# Patient Record
Sex: FEMALE | URBAN - METROPOLITAN AREA
[De-identification: names, ages, dates, MRNs, and addresses within clinical notes are randomized per-mention and may not be internally consistent; named-entity substitution may affect disease eponyms.]

---

## 2017-03-30 ENCOUNTER — APPOINTMENT (RX ONLY)
Dept: URBAN - METROPOLITAN AREA CLINIC 17 | Facility: CLINIC | Age: 72
Setting detail: DERMATOLOGY
End: 2017-03-30

## 2017-03-30 DIAGNOSIS — B00.1 HERPESVIRAL VESICULAR DERMATITIS: ICD-10-CM

## 2017-03-30 DIAGNOSIS — L57.0 ACTINIC KERATOSIS: ICD-10-CM

## 2017-03-30 DIAGNOSIS — L82.1 OTHER SEBORRHEIC KERATOSIS: ICD-10-CM

## 2017-03-30 DIAGNOSIS — L57.8 OTHER SKIN CHANGES DUE TO CHRONIC EXPOSURE TO NONIONIZING RADIATION: ICD-10-CM

## 2017-03-30 DIAGNOSIS — D18.0 HEMANGIOMA: ICD-10-CM

## 2017-03-30 DIAGNOSIS — Z71.89 OTHER SPECIFIED COUNSELING: ICD-10-CM

## 2017-03-30 DIAGNOSIS — L73.8 OTHER SPECIFIED FOLLICULAR DISORDERS: ICD-10-CM

## 2017-03-30 PROBLEM — D18.01 HEMANGIOMA OF SKIN AND SUBCUTANEOUS TISSUE: Status: ACTIVE | Noted: 2017-03-30

## 2017-03-30 PROBLEM — L23.7 ALLERGIC CONTACT DERMATITIS DUE TO PLANTS, EXCEPT FOOD: Status: ACTIVE | Noted: 2017-03-30

## 2017-03-30 PROCEDURE — ? BENIGN DESTRUCTION COSMETIC

## 2017-03-30 PROCEDURE — 17000 DESTRUCT PREMALG LESION: CPT

## 2017-03-30 PROCEDURE — ? LIQUID NITROGEN

## 2017-03-30 PROCEDURE — ? COUNSELING

## 2017-03-30 PROCEDURE — 99203 OFFICE O/P NEW LOW 30 MIN: CPT | Mod: 25

## 2017-03-30 PROCEDURE — ? TREATMENT REGIMEN

## 2017-03-30 ASSESSMENT — LOCATION ZONE DERM
LOCATION ZONE: LIP
LOCATION ZONE: HAND
LOCATION ZONE: FACE

## 2017-03-30 ASSESSMENT — LOCATION SIMPLE DESCRIPTION DERM
LOCATION SIMPLE: LEFT LIP
LOCATION SIMPLE: RIGHT HAND
LOCATION SIMPLE: LEFT FOREHEAD

## 2017-03-30 ASSESSMENT — LOCATION DETAILED DESCRIPTION DERM
LOCATION DETAILED: LEFT MEDIAL FOREHEAD
LOCATION DETAILED: RIGHT RADIAL DORSAL HAND
LOCATION DETAILED: LEFT INFERIOR VERMILION LIP

## 2017-03-30 ASSESSMENT — TOTAL NUMBER OF LESIONS: # OF LESIONS?: 1

## 2017-03-30 NOTE — PROCEDURE: BENIGN DESTRUCTION COSMETIC
Anesthesia Volume In Cc: 0
Price (Use Numbers Only, No Special Characters Or $): 75
Detail Level: Zone
Post-Care Instructions: I reviewed with the patient in detail post-care instructions. Patient is to wear sunprotection, and avoid picking at any of the treated lesions. Pt may apply Vaseline to crusted or scabbing areas.
Consent: The patient's consent was obtained including but not limited to risks of crusting, scabbing, blistering, scarring, darker or lighter pigmentary change, recurrence, incomplete removal and infection.

## 2017-03-30 NOTE — HPI: NON-MELANOMA SKIN CANCER F/U (HISTORY OF NMSC)
How Many Skin Cancers Have You Had?: one
What Is The Reason For Today's Visit?: History of Non-Melanoma Skin Cancer
When Was Your Last Cancer Diagnosed?: 1 year

## 2017-03-30 NOTE — PROCEDURE: LIQUID NITROGEN
Consent: The patient's consent was obtained including but not limited to risks of crusting, scabbing, blistering, scarring, darker or lighter pigmentary change, recurrence, incomplete removal and infection.
Post-Care Instructions: LIQUID NITROGEN TREATMENT\\n(Cryosurgery)\\n\\n Liquid Nitrogen (LN2) is a cold, liquefied gas with a temperature of 196 degrees below zero Celsius (minus 321 degrees Fahrenheit).  It is used to freeze and destroy superficial skin growths such as warts and keratoses.  It can also be used to treat pre-malignant skin lesions known as actinic keratoses.  LN2 causes stinging and mild pain while the growth is being frozen and then thaws.  The discomfort usually lasts less than fifteen minutes.  On the fingers a throbbing pain will occasionally last  a few hours.\\n After LN2 treatment your skin will become swollen and red and it may blister.  Then a scab will form.  It will fall off by itself in one to three weeks.  The skin growth will come off with the scab, leaving healthy, new skin.\\n Generally, no special care is needed after liquid nitrogen treatment.  Just ignore it.  You can wash your skin as usual and use make-up or other cosmetics.  If clothing irritates the area, cover it with a small bandage (Band-Aid).\\n If a very large or uncomfortable blister develops you may open it with a sterilized needle.  The needle can be sterilized by wiping with rubbing alcohol.  Gently clean the blistered area with soap and water, followed by alcohol.  Insert the needle into the edge of the blister as needed to allow the blister contents to escape.  Press the blister gently to force out the fluid or blood.  This will reduce pain caused by blister fluid pressure.  If a blister re-forms it may be opened again.  Do not remove the top of the blister since its presence helps prevent infection until the new skin beneath can replace it.  Protect open sores or punctured blisters with Polysporin or Bacitracin antibiotic ointment (available without a prescription) under a Band-Aid for 24-48 hours.\\n If you notice any signs of infection such as oozing of a discolored substance (pus), spreading redness, excessive swelling or increased pain usually appearing 2 days or more after the office treatment, please call the office.  Your usual pain reliever (such as Tylenol and aspirin) is normally sufficient to alleviate discomfort.  Intermittent use of an ice pack for 5 to 10 minutes each hour can also be used if needed.  If you have severe pain, please call the office.  Do not pick at crusted areas.  Allow them to come off on their own.\\n If a lesion was treated near your eye, you may notice swelling of one or both lids within 24 hours, so that your lids are partially closed.  This is harmless, will not affect your vision, and will resolve by itself in a day or two.\\n Sometimes LN2 fails or does not completely remove the growth.  This is especially true with larger warts, which often require more than a single treatment for cure.  If the growth is not cured with LN2 and you do not have a scheduled follow-up appointment for further treatment, please call to make a return appointment.
Duration Of Freeze Thaw-Cycle (Seconds): 5
Render Post-Care Instructions In Note?: no
Total Number Of Aks Treated: 1
Detail Level: Simple

## 2017-05-31 ENCOUNTER — APPOINTMENT (RX ONLY)
Dept: URBAN - METROPOLITAN AREA CLINIC 17 | Facility: CLINIC | Age: 72
Setting detail: DERMATOLOGY
End: 2017-05-31

## 2017-05-31 DIAGNOSIS — B00.1 HERPESVIRAL VESICULAR DERMATITIS: ICD-10-CM

## 2017-05-31 DIAGNOSIS — L57.0 ACTINIC KERATOSIS: ICD-10-CM

## 2017-05-31 PROBLEM — L55.1 SUNBURN OF SECOND DEGREE: Status: ACTIVE | Noted: 2017-05-31

## 2017-05-31 PROBLEM — Z85.828 PERSONAL HISTORY OF OTHER MALIGNANT NEOPLASM OF SKIN: Status: ACTIVE | Noted: 2017-05-31

## 2017-05-31 PROCEDURE — ? TREATMENT REGIMEN

## 2017-05-31 PROCEDURE — ? LIQUID NITROGEN

## 2017-05-31 PROCEDURE — 99212 OFFICE O/P EST SF 10 MIN: CPT | Mod: 25

## 2017-05-31 PROCEDURE — ? COUNSELING

## 2017-05-31 PROCEDURE — 17000 DESTRUCT PREMALG LESION: CPT

## 2017-05-31 ASSESSMENT — LOCATION DETAILED DESCRIPTION DERM
LOCATION DETAILED: LEFT INFERIOR VERMILION LIP
LOCATION DETAILED: RIGHT RADIAL DORSAL HAND

## 2017-05-31 ASSESSMENT — LOCATION ZONE DERM
LOCATION ZONE: LIP
LOCATION ZONE: HAND

## 2017-05-31 ASSESSMENT — LOCATION SIMPLE DESCRIPTION DERM
LOCATION SIMPLE: RIGHT HAND
LOCATION SIMPLE: LEFT LIP

## 2017-05-31 ASSESSMENT — TOTAL NUMBER OF LESIONS: # OF LESIONS?: 1

## 2017-05-31 NOTE — PROCEDURE: LIQUID NITROGEN
Render Post-Care Instructions In Note?: no
Post-Care Instructions: LIQUID NITROGEN TREATMENT\\n(Cryosurgery)\\n\\n Liquid Nitrogen (LN2) is a cold, liquefied gas with a temperature of 196 degrees below zero Celsius (minus 321 degrees Fahrenheit).  It is used to freeze and destroy superficial skin growths such as warts and keratoses.  It can also be used to treat pre-malignant skin lesions known as actinic keratoses.  LN2 causes stinging and mild pain while the growth is being frozen and then thaws.  The discomfort usually lasts less than fifteen minutes.  On the fingers a throbbing pain will occasionally last  a few hours.\\n After LN2 treatment your skin will become swollen and red and it may blister.  Then a scab will form.  It will fall off by itself in one to three weeks.  The skin growth will come off with the scab, leaving healthy, new skin.\\n Generally, no special care is needed after liquid nitrogen treatment.  Just ignore it.  You can wash your skin as usual and use make-up or other cosmetics.  If clothing irritates the area, cover it with a small bandage (Band-Aid).\\n If a very large or uncomfortable blister develops you may open it with a sterilized needle.  The needle can be sterilized by wiping with rubbing alcohol.  Gently clean the blistered area with soap and water, followed by alcohol.  Insert the needle into the edge of the blister as needed to allow the blister contents to escape.  Press the blister gently to force out the fluid or blood.  This will reduce pain caused by blister fluid pressure.  If a blister re-forms it may be opened again.  Do not remove the top of the blister since its presence helps prevent infection until the new skin beneath can replace it.  Protect open sores or punctured blisters with Polysporin or Bacitracin antibiotic ointment (available without a prescription) under a Band-Aid for 24-48 hours.\\n If you notice any signs of infection such as oozing of a discolored substance (pus), spreading redness, excessive swelling or increased pain usually appearing 2 days or more after the office treatment, please call the office.  Your usual pain reliever (such as Tylenol and aspirin) is normally sufficient to alleviate discomfort.  Intermittent use of an ice pack for 5 to 10 minutes each hour can also be used if needed.  If you have severe pain, please call the office.  Do not pick at crusted areas.  Allow them to come off on their own.\\n If a lesion was treated near your eye, you may notice swelling of one or both lids within 24 hours, so that your lids are partially closed.  This is harmless, will not affect your vision, and will resolve by itself in a day or two.\\n Sometimes LN2 fails or does not completely remove the growth.  This is especially true with larger warts, which often require more than a single treatment for cure.  If the growth is not cured with LN2 and you do not have a scheduled follow-up appointment for further treatment, please call to make a return appointment.
Duration Of Freeze Thaw-Cycle (Seconds): 5
Total Number Of Aks Treated: 1
Consent: The patient's consent was obtained including but not limited to risks of crusting, scabbing, blistering, scarring, darker or lighter pigmentary change, recurrence, incomplete removal and infection.
Detail Level: Simple